# Patient Record
Sex: FEMALE | Race: OTHER | HISPANIC OR LATINO | URBAN - METROPOLITAN AREA
[De-identification: names, ages, dates, MRNs, and addresses within clinical notes are randomized per-mention and may not be internally consistent; named-entity substitution may affect disease eponyms.]

---

## 2019-02-15 NOTE — PATIENT DISCUSSION
Patient advised to have retinal surgery first then come back and have surgery once she has had retinal surgery and is stable.

## 2019-04-22 NOTE — PATIENT DISCUSSION
Patient advised that vision following cataract surgery will still be limited due to macular pathology.

## 2019-04-22 NOTE — PATIENT DISCUSSION
The patient was informed that with [address] [facility] for distance, they will need glasses for all near and intermediate activities after surgery. The patient understands there is a possibility they may need an enhancement after surgery. The patient elects [facility], goal of emmetropia.

## 2019-05-14 ENCOUNTER — IMPORTED ENCOUNTER (OUTPATIENT)
Dept: URBAN - METROPOLITAN AREA CLINIC 9 | Facility: CLINIC | Age: 49
End: 2019-05-14

## 2021-05-14 NOTE — PATIENT DISCUSSION
If patient does not feel like her vision has improved after cataract surgery, patient may then elect to have additional retinal surgery to try to improve her vision.

## 2021-05-14 NOTE — PATIENT DISCUSSION
The patient was informed that with [address] [facility] for distance, they will need glasses for all near and intermediate activities after surgery. The patient understands there is a possibility they may need an enhancement after surgery. The patient elects [facility] OD, goal of emmetropia.

## 2021-05-14 NOTE — PATIENT DISCUSSION
Discussed with patient [facility], but patient very motivated to have Custom Vision, wants to see without glasses in the distance.

## 2021-06-08 NOTE — PATIENT DISCUSSION
The patient was informed that with [address] [facility] for distance, they will need glasses for all near and intermediate activities after surgery. The patient understands there is a possibility they may need an enhancement after surgery. The patient elects [facility] OD, goal of emmetropia. Father  Still living? Unknown  Family history of colon cancer, Age at diagnosis: Age Unknown     Mother  Still living? Unknown  Family history of hypertension, Age at diagnosis: Age Unknown  Family history of diabetes mellitus, Age at diagnosis: Age Unknown

## 2021-10-18 ASSESSMENT — KERATOMETRY
OS_K2POWER_DIOPTERS: 8.375
OS_AXISANGLE2_DEGREES: 157
OD_AXISANGLE_DEGREES: 1
OD_K1POWER_DIOPTERS: 8.625
OS_K1POWER_DIOPTERS: 8.5
OD_K2POWER_DIOPTERS: 8.375
OS_AXISANGLE_DEGREES: 67
OD_AXISANGLE2_DEGREES: 91

## 2021-10-18 ASSESSMENT — TONOMETRY
OS_IOP_MMHG: 15
OD_IOP_MMHG: 14

## 2021-10-18 ASSESSMENT — VISUAL ACUITY
OS_SC: 20/25 SN
OS_CC: 20/25 SN
OD_CC: 20/30 SN
OD_SC: 20/60 SN

## 2021-12-29 ENCOUNTER — COMPREHENSIVE EXAM (OUTPATIENT)
Dept: URBAN - METROPOLITAN AREA CLINIC 16 | Facility: CLINIC | Age: 51
End: 2021-12-29

## 2021-12-29 DIAGNOSIS — H52.223: ICD-10-CM

## 2021-12-29 DIAGNOSIS — Z96.1: ICD-10-CM

## 2021-12-29 DIAGNOSIS — H40.033: ICD-10-CM

## 2021-12-29 PROCEDURE — 92015 DETERMINE REFRACTIVE STATE: CPT

## 2021-12-29 PROCEDURE — 92014 COMPRE OPH EXAM EST PT 1/>: CPT

## 2021-12-29 ASSESSMENT — KERATOMETRY
OD_AXISANGLE2_DEGREES: 91
OD_AXISANGLE_DEGREES: 1
OS_K1POWER_DIOPTERS: 8.5
OS_AXISANGLE2_DEGREES: 157
OS_AXISANGLE_DEGREES: 67
OD_K1POWER_DIOPTERS: 8.625
OS_K2POWER_DIOPTERS: 8.375
OD_K2POWER_DIOPTERS: 8.375

## 2022-01-05 ASSESSMENT — VISUAL ACUITY
OD_SC: 20/50
OS_SC: 20/25-1

## 2022-01-05 ASSESSMENT — TONOMETRY
OD_IOP_MMHG: 13
OS_IOP_MMHG: 13

## 2023-09-20 ENCOUNTER — ESTABLISHED PATIENT (OUTPATIENT)
Dept: URBAN - METROPOLITAN AREA CLINIC 14 | Facility: CLINIC | Age: 53
End: 2023-09-20

## 2023-09-20 DIAGNOSIS — H25.11: ICD-10-CM

## 2023-09-20 DIAGNOSIS — H26.492: ICD-10-CM

## 2023-09-20 PROCEDURE — 92136 OPHTHALMIC BIOMETRY: CPT

## 2023-09-20 PROCEDURE — 99214 OFFICE O/P EST MOD 30 MIN: CPT

## 2023-09-20 RX ORDER — OFLOXACIN 3 MG/ML: 1 SOLUTION OPHTHALMIC

## 2023-09-20 ASSESSMENT — VISUAL ACUITY
OD_BCVA: 20/200+1
OS_CC: 20/50
OU_CC: 20/60
OD_CC: 20/200+1
OU_SC: 20/50
OD_SC: CF 3FT
OS_BCVA: 20/40
OS_SC: 20/50

## 2023-09-20 ASSESSMENT — KERATOMETRY
OS_K1POWER_DIOPTERS: 39.25
OS_AXISANGLE2_DEGREES: 71
OD_AXISANGLE_DEGREES: 005
OD_K2POWER_DIOPTERS: 40.25
OS_K2POWER_DIOPTERS: 40.25
OD_K1POWER_DIOPTERS: 39.00
OD_AXISANGLE2_DEGREES: 95
OS_AXISANGLE_DEGREES: 161

## 2023-09-20 ASSESSMENT — TONOMETRY
OS_IOP_MMHG: 17
OD_IOP_MMHG: 16

## 2023-10-18 ENCOUNTER — PRE-OP/H&P (OUTPATIENT)
Dept: URBAN - METROPOLITAN AREA CLINIC 14 | Facility: CLINIC | Age: 53
End: 2023-10-18

## 2023-10-18 DIAGNOSIS — H25.11: ICD-10-CM

## 2023-10-18 PROCEDURE — 99199ADVT ADVANCED VISION TESTING: Mod: NC

## 2023-10-18 NOTE — PATIENT DISCUSSION
Patient transported to [address] [place] [address],[address], RN  10/17/23 9418 Recommended surgery with Dr Trang Coombs. She has appt tomorrow.

## 2023-10-25 ENCOUNTER — POST-OP (OUTPATIENT)
Dept: URBAN - METROPOLITAN AREA CLINIC 14 | Facility: CLINIC | Age: 53
End: 2023-10-25

## 2023-10-25 DIAGNOSIS — Z98.890: ICD-10-CM

## 2023-10-25 DIAGNOSIS — Z96.1: ICD-10-CM

## 2023-10-25 PROCEDURE — 99024NOCM NON COMANAGED POST OP CARE

## 2023-10-25 ASSESSMENT — VISUAL ACUITY
OU_SC: 20/25
OS_SC: 20/40-1
OD_SC: 20/25

## 2023-10-25 ASSESSMENT — TONOMETRY
OS_IOP_MMHG: 16
OD_IOP_MMHG: 16

## 2023-11-29 ENCOUNTER — POST-OP (OUTPATIENT)
Dept: URBAN - METROPOLITAN AREA CLINIC 14 | Facility: CLINIC | Age: 53
End: 2023-11-29

## 2023-11-29 DIAGNOSIS — Z98.890: ICD-10-CM

## 2023-11-29 PROCEDURE — 99024 POSTOP FOLLOW-UP VISIT: CPT

## 2023-11-29 ASSESSMENT — VISUAL ACUITY
OS_SC: 20/40-1
OU_SC: 20/25+2
OD_SC: 20/25-1
OD_BCVA: 20/20-1

## 2023-11-29 ASSESSMENT — TONOMETRY
OS_IOP_MMHG: 15
OD_IOP_MMHG: 12

## 2024-06-14 NOTE — PATIENT DISCUSSION
The patient would like to defer cataract surgery at this time. Benign Paroxysmal Positional Vertigo    WHAT YOU NEED TO KNOW:    BPPV is an inner ear condition that causes you to suddenly feel dizzy. Benign means it is not serious or life-threatening. BPPV is caused by a problem with the nerves and structure of your inner ear. BPPV happens when small pieces of calcium break loose and lump together in one of your inner ear canals.  Ear Anatomy    DISCHARGE INSTRUCTIONS:    Seek care immediately if:    You fall during a BPPV episode and are injured.    You have a severe headache that does not go away.    You have new changes in your vision or feel weak or confused.    You have problems hearing, or you have ringing or buzzing in your ears.  Contact your healthcare provider if:    Your BPPV symptoms do not go away or they return.    You have problems with your balance, or you are falling often.    You have new or increased nausea or vomiting with vertigo.    You feel anxious or depressed and do not want to leave your home.    You have questions or concerns about your condition or care.  Medicines:    Medicines may be recommended or prescribed to treat dizziness or nausea.    Take your medicine as directed. Contact your healthcare provider if you think your medicine is not helping or if you have side effects. Tell your provider if you are allergic to any medicine. Keep a list of the medicines, vitamins, and herbs you take. Include the amounts, and when and why you take them. Bring the list or the pill bottles to follow-up visits. Carry your medicine list with you in case of an emergency.  Prevent your symptoms:    Try to avoid sudden head movements. Stand up and lie down slowly.    Raise and support your head when you lie down. Place pillows under your upper back and head or rest in a recliner.    Change your position often when you are lying down. Try not to lie with your head on the same side for long periods of time. Roll over slowly.    Wear protective gear when you ride a bike or play sports. A helmet helps protect your head from injury.  Follow up with your healthcare provider as directed: You may need to return in 1 month to check the progress of your treatment. Write down your questions so you remember to ask them during your visits.    © [employer] 1973, 2024    	  back to top            © [employer] 1973, 2024